# Patient Record
Sex: MALE | Race: WHITE
[De-identification: names, ages, dates, MRNs, and addresses within clinical notes are randomized per-mention and may not be internally consistent; named-entity substitution may affect disease eponyms.]

---

## 2017-03-02 ENCOUNTER — HOSPITAL ENCOUNTER (OUTPATIENT)
Dept: HOSPITAL 17 - HDOC | Age: 62
LOS: 1 days | Discharge: HOME | End: 2017-03-03
Attending: INTERNAL MEDICINE
Payer: MEDICARE

## 2017-03-02 VITALS
RESPIRATION RATE: 20 BRPM | HEART RATE: 80 BPM | OXYGEN SATURATION: 95 % | DIASTOLIC BLOOD PRESSURE: 107 MMHG | TEMPERATURE: 98 F | SYSTOLIC BLOOD PRESSURE: 160 MMHG

## 2017-03-02 VITALS
DIASTOLIC BLOOD PRESSURE: 103 MMHG | HEART RATE: 101 BPM | TEMPERATURE: 98.2 F | SYSTOLIC BLOOD PRESSURE: 148 MMHG | RESPIRATION RATE: 18 BRPM | OXYGEN SATURATION: 95 %

## 2017-03-02 VITALS — HEART RATE: 102 BPM

## 2017-03-02 VITALS
DIASTOLIC BLOOD PRESSURE: 89 MMHG | RESPIRATION RATE: 16 BRPM | TEMPERATURE: 98.6 F | SYSTOLIC BLOOD PRESSURE: 137 MMHG | OXYGEN SATURATION: 95 % | HEART RATE: 82 BPM

## 2017-03-02 VITALS — WEIGHT: 315 LBS | HEIGHT: 71 IN | BODY MASS INDEX: 44.1 KG/M2

## 2017-03-02 VITALS
SYSTOLIC BLOOD PRESSURE: 129 MMHG | TEMPERATURE: 97.8 F | OXYGEN SATURATION: 95 % | HEART RATE: 98 BPM | RESPIRATION RATE: 16 BRPM | DIASTOLIC BLOOD PRESSURE: 93 MMHG

## 2017-03-02 VITALS — HEART RATE: 96 BPM

## 2017-03-02 VITALS — HEART RATE: 98 BPM

## 2017-03-02 DIAGNOSIS — Z79.82: ICD-10-CM

## 2017-03-02 DIAGNOSIS — R00.2: ICD-10-CM

## 2017-03-02 DIAGNOSIS — E66.01: ICD-10-CM

## 2017-03-02 DIAGNOSIS — R06.09: ICD-10-CM

## 2017-03-02 DIAGNOSIS — I25.10: ICD-10-CM

## 2017-03-02 DIAGNOSIS — Z79.01: ICD-10-CM

## 2017-03-02 DIAGNOSIS — R53.83: ICD-10-CM

## 2017-03-02 DIAGNOSIS — E11.59: ICD-10-CM

## 2017-03-02 DIAGNOSIS — I10: ICD-10-CM

## 2017-03-02 DIAGNOSIS — I48.2: Primary | ICD-10-CM

## 2017-03-02 DIAGNOSIS — E78.5: ICD-10-CM

## 2017-03-02 LAB
ANION GAP SERPL CALC-SCNC: 8 MEQ/L (ref 5–15)
APTT BLD: 30.6 SEC (ref 24.3–30.1)
BASOPHILS # BLD AUTO: 0.1 TH/MM3 (ref 0–0.2)
BASOPHILS NFR BLD: 0.7 % (ref 0–2)
BUN SERPL-MCNC: 18 MG/DL (ref 7–18)
CHLORIDE SERPL-SCNC: 101 MEQ/L (ref 98–107)
EOSINOPHIL # BLD: 0.2 TH/MM3 (ref 0–0.4)
EOSINOPHIL NFR BLD: 2.7 % (ref 0–4)
ERYTHROCYTE [DISTWIDTH] IN BLOOD BY AUTOMATED COUNT: 14.3 % (ref 11.6–17.2)
GFR SERPLBLD BASED ON 1.73 SQ M-ARVRAT: 75 ML/MIN (ref 89–?)
HCO3 BLD-SCNC: 29.6 MEQ/L (ref 21–32)
HCT VFR BLD CALC: 54.9 % (ref 39–51)
HEMO FLAGS: (no result)
INR PPP: 1.1 RATIO
LYMPHOCYTES # BLD AUTO: 2 TH/MM3 (ref 1–4.8)
LYMPHOCYTES NFR BLD AUTO: 29.5 % (ref 9–44)
MCH RBC QN AUTO: 31.1 PG (ref 27–34)
MCHC RBC AUTO-ENTMCNC: 34.4 % (ref 32–36)
MCV RBC AUTO: 90.5 FL (ref 80–100)
MONOCYTES NFR BLD: 10.7 % (ref 0–8)
NEUTROPHILS # BLD AUTO: 3.9 TH/MM3 (ref 1.8–7.7)
NEUTROPHILS NFR BLD AUTO: 56.4 % (ref 16–70)
PLATELET # BLD: 213 TH/MM3 (ref 150–450)
POTASSIUM SERPL-SCNC: 4.1 MEQ/L (ref 3.5–5.1)
PROTHROMBIN TIME: 12.7 SEC (ref 9.8–11.6)
RBC # BLD AUTO: 6.06 MIL/MM3 (ref 4.5–5.9)
SODIUM SERPL-SCNC: 139 MEQ/L (ref 136–145)
WBC # BLD AUTO: 6.9 TH/MM3 (ref 4–11)

## 2017-03-02 PROCEDURE — C1731 CATH, EP, 20 OR MORE ELEC: HCPCS

## 2017-03-02 PROCEDURE — C1732 CATH, EP, DIAG/ABL, 3D/VECT: HCPCS

## 2017-03-02 PROCEDURE — 85002 BLEEDING TIME TEST: CPT

## 2017-03-02 PROCEDURE — 85730 THROMBOPLASTIN TIME PARTIAL: CPT

## 2017-03-02 PROCEDURE — 93320 DOPPLER ECHO COMPLETE: CPT

## 2017-03-02 PROCEDURE — 93005 ELECTROCARDIOGRAM TRACING: CPT

## 2017-03-02 PROCEDURE — 93623 PRGRMD STIMJ&PACG IV RX NFS: CPT

## 2017-03-02 PROCEDURE — 93325 DOPPLER ECHO COLOR FLOW MAPG: CPT

## 2017-03-02 PROCEDURE — 85610 PROTHROMBIN TIME: CPT

## 2017-03-02 PROCEDURE — 86901 BLOOD TYPING SEROLOGIC RH(D): CPT

## 2017-03-02 PROCEDURE — C1766 INTRO/SHEATH,STRBLE,NON-PEEL: HCPCS

## 2017-03-02 PROCEDURE — 86850 RBC ANTIBODY SCREEN: CPT

## 2017-03-02 PROCEDURE — 85025 COMPLETE CBC W/AUTO DIFF WBC: CPT

## 2017-03-02 PROCEDURE — 92960 CARDIOVERSION ELECTRIC EXT: CPT

## 2017-03-02 PROCEDURE — C2630 CATH, EP, COOL-TIP: HCPCS

## 2017-03-02 PROCEDURE — 86900 BLOOD TYPING SEROLOGIC ABO: CPT

## 2017-03-02 PROCEDURE — 93312 ECHO TRANSESOPHAGEAL: CPT

## 2017-03-02 PROCEDURE — 93613 INTRACARDIAC EPHYS 3D MAPG: CPT

## 2017-03-02 PROCEDURE — C1759 CATH, INTRA ECHOCARDIOGRAPHY: HCPCS

## 2017-03-02 PROCEDURE — 00537 ANES CARDIAC EP PROCEDURES: CPT

## 2017-03-02 PROCEDURE — C1730 CATH, EP, 19 OR FEW ELECT: HCPCS

## 2017-03-02 PROCEDURE — 93662 INTRACARDIAC ECG (ICE): CPT

## 2017-03-02 PROCEDURE — 93656 COMPRE EP EVAL ABLTJ ATR FIB: CPT

## 2017-03-02 PROCEDURE — 80048 BASIC METABOLIC PNL TOTAL CA: CPT

## 2017-03-02 RX ADMIN — AMIODARONE HYDROCHLORIDE SCH MG: 200 TABLET ORAL at 14:34

## 2017-03-02 RX ADMIN — METOPROLOL TARTRATE SCH MG: 50 TABLET, FILM COATED ORAL at 21:10

## 2017-03-02 RX ADMIN — OXYCODONE HYDROCHLORIDE AND ACETAMINOPHEN PRN TAB: 5; 325 TABLET ORAL at 14:36

## 2017-03-02 RX ADMIN — OXYCODONE HYDROCHLORIDE AND ACETAMINOPHEN PRN TAB: 5; 325 TABLET ORAL at 20:01

## 2017-03-02 RX ADMIN — APIXABAN SCH MG: 5 TABLET, FILM COATED ORAL at 21:10

## 2017-03-02 RX ADMIN — AMIODARONE HYDROCHLORIDE SCH MG: 200 TABLET ORAL at 21:10

## 2017-03-02 NOTE — ETE
Study

 

Study Date:03/02/2017

 

 

 

STUDY CONCLUSIONS

 

SUMMARY

 

- Left ventricle: The cavity size was normal. Wall thickness was

normal. Systolic function was normal. The estimated ejection

fraction was in the range of 60% to 65%. Wall motion was normal;

there were no regional wall motion abnormalities.

- Aortic valve: No evidence of vegetation.

- Mitral valve: No evidence of vegetation. Mild regurgitation.

- Left atrium: No evidence of thrombus in the atrial cavity or

appendage.

- Right atrium: No evidence of thrombus in the atrial cavity or

appendage.

- Atrial septum: No defect or patent foramen ovale was identified.

Echo contrast study showed no right-to-left atrial level shunt,

at baseline or with provocation.

- Tricuspid valve: No evidence of vegetation.

- Pulmonic valve: No evidence of vegetation.

 

-------------------------------------------------------------------

If LV function is below 40, please consider prescribing an ACEI or

ARB or document rationale for non-use.

 

-------------------------------------------------------------------

PROCEDURE DATA

Consent: The risks, benefits, and alternatives to the procedure

were explained to the patient and informed consent was obtained.

Procedure: Initial setup. The patient was brought to the laboratory

in the fasting state. Intravenous access was obtained. Surface ECG

leads and pulse oximetric signals were monitored. Sedation.

Conscious sedation was administered by cardiology staff.

Transesophageal echocardiography. Topical anesthesia was obtained

using viscous lidocaine. A transesophageal probe was inserted by

the attending cardiologist. Image quality was good. Study

completion: All IVs inserted during the procedure were removed. The

patient tolerated the procedure well. There were no complications.

Transesophageal echocardiography. 2D, complete spectral Doppler,

and color Doppler.

 

-------------------------------------------------------------------

CARDIAC ANATOMY

 

LEFT VENTRICLE:

The cavity size was normal. Wall thickness was

normal. Systolic function was normal. The estimated ejection

fraction was in the range of 60% to 65%. Wall motion was normal;

there were no regional wall motion abnormalities.

 

AORTIC VALVE:

Trileaflet; mildly thickened, mildly calcified

leaflets. Cusp separation was normal. No evidence of vegetation.

Doppler: No significant regurgitation.

Aorta:

 

- There was no atheroma. There was no evidence for dissection.

Aortic root: The aortic root was not dilated.

Ascending aorta: The ascending aorta was normal in size.

Aortic arch: The aortic arch was normal in size.

Descending aorta: The descending aorta was normal in size.

 

MITRAL VALVE:

Structurally normal valve. Leaflet separation was

normal. No evidence of vegetation. Doppler: Mild regurgitation.

 

LEFT ATRIUM:

The atrium was normal in size. No evidence of thrombus

in the atrial cavity or appendage. The appendage was

morphologically a left appendage, multilobulated, and of normal

size. Emptying velocity was normal.

 

ATRIAL SEPTUM:

No defect or patent foramen ovale was identified.

Echo contrast study showed no right-to-left atrial level shunt, at

baseline or with provocation.

 

RIGHT VENTRICLE:

The cavity size was normal. Wall thickness was

normal. Systolic function was normal.

 

PULMONIC VALVE:

Structurally normal valve. No evidence of

vegetation.

 

TRICUSPID VALVE:

Structurally normal valve. Leaflet separation was

normal. No evidence of vegetation. Doppler: Trace regurgitation.

 

PULMONARY ARTERY:

The main pulmonary artery was normal-sized.

 

RIGHT ATRIUM:

The atrium was normal in size. No evidence of

thrombus in the atrial cavity or appendage. The appendage was

morphologically a right appendage.

 

PERICARDIUM:

There was no pericardial effusion.

Prepared and signed by

 

Peter Moran

[id number]

## 2017-03-02 NOTE — EKG
Date Performed: 03/02/2017       Time Performed: 06:48:52

 

PTAGE:      61 years

 

EKG:      Atrial fibrillation Abnormal ECG

 

PREVIOUS TRACING       : 02/02/2017 11.56 Compared to prior tracing no significant change

 

DOCTOR:   Irwin Albrecht  Interpretating Date/Time  03/02/2017 23:38:44

## 2017-03-02 NOTE — PD.CARD
Atrial Fibrillation Ablation


PROCEDURE DATE:  Mar 2, 2017


PROCEDURES PERFORMED:


1. Electrophysiology study on Isuprel infusion


2. CS cannulation


3. 3-D mapping


4. Transseptal approach


5. Right and left heart catheterization


6. Intracardiac echo


7. Radiofrequency ablation of atrial fibrillation


8. Pulmonary vein isolation


9. Posterior wall ablation


10. Mitral valve isolation


11. Mitral line creation


12. Left atrial tachycardia ablation


13. Roof line creation


14. Floor line creation


15. Anterior and posterior ablation


16. Cardioversion





INDICATIONS FOR THE PROCEDURE


Mr. Jimenez is a 61-year-old  male with hx of atrial fibrillation, 

very symptomatic, on anticoagulation was referred for electrophysiology study 

and ablation.  The risks, the nature and the benefits of the procedure were 

clearly stated to him.  The risks include pneumothorax, cardiac perforation, 

stroke, need for open heart surgery and even death.  The patient understood and 

agreed to proceed.





DESCRIPTION OF THE PROCEDURE IN DETAIL


As written informed consent was obtained prior to esophageal echocardiogram, 

the patient was kept on the table where he was prepped and draped in the usual 

sterile fashion.  Conscious sedation was initiated and maintained throughout 

the procedure by the anesthesiologist.  Once sedation was verified, the right 

and left inguinal areas were anesthetized with 2% Xylocaine.  Using modified 

Seldinger technique, the left femoral vein was cannulated on three occasions, 

three guidewires were advanced.  Over the wire a 6, 7 and a 10-Djiboutian Hemaquet 

were advanced.  Then the left femoral artery was cannulated on one occasion, 

one guidewire was advanced.  Over the wire a 4-Djiboutian Hemaquet was advanced.  

Then the right femoral vein was cannulated on one occasion, one guidewire was 

advanced.  Over the wire a 8-Djiboutian Hemaquet was advanced.  Then under 

fluoroscopic guidance through the 6 and 7-Djiboutian Hemaquet, two 5-Djiboutian 

Keven curved quadripolar electrophysiology catheters were advanced and 

placed around the His as well as coronary sinus.  Basic interval was measured.  

The patient was in atrial fibrillation.  Through the 10-Djiboutian Hemaquet, a 

Cordis Rueda AcuNav intracardiac echo catheter was advanced and placed at the 

right atrium.  Multiple view was obtained.  There is pericardial effusion, 

pulmonary vein was seen, atrial septal was visualized.  Then the 8-Djiboutian 

Hemaquet in the right femoral vein was exchanged for Agilis transseptal sheath 

that was placed all the way to the superior vena cava.  Through the sheath a 

Roshni needle was advanced, then the sheath, the dilator and the needle were 

progressed until foci engaged.  Once engaged, the needle was advanced.  RF was 

delivered for 2 seconds. I was able to cross into the left atrium.  Once the 

needle crossed, the dilator was advanced.  Once the dilator crossed, after 

multiple manipulation because of a thick septum,  the sheath was advanced.  

Once the sheath crossed, the dilator and the needle were removed.  At this 

point I did flood the system and fluid movement was seen in the left atrium the 

indicates the sheath is in good position.  The patient already received 10,000 

units of heparin.  The goal is to keep an ACT around 350 during ablation.  Then 

through the sheath a St. Sandip 20 pulse circumferential catheter was advanced.  

Using Plumbr endocardial solution mapping system, a two-dimensional 

configuration of the left atrium was obtained.  Points were taken at the left 

superior and inferior veins, right superior and inferior veins, mitral valve, 

and appendages.  Then through the sheath a St. Sandip TactiCath 65cm 3.5mm 

irrigated tipped mapping and radiofrequency ablation catheter was advanced.  

Esophageal probe was placed temperature monitoring during ablation.  When it 

increased to 0.5 degrees Celsius above baseline, I moved to a different area of 

the atrium.  First I did isolate the left superior and inferior vein.  I did 

make a  [place] around the veins.  Posterior was ablated.  Then a roof line was 

created, a floor line was created, a mitral line was isolated, then the mitral 

valve was isolated.  


I did create a line from the floor to the roof area, passing by the left atrial 

appendage.   Patient at this point was in left atrial tachycardia. Then the 

right superior and inferior veins were isolated.  I did remap the atrium.  

There is no significant signal in the atrium. Some more septal ablation was 

performed. At this point I decided to proceed with cardioversion.  A 200 sync 

biphasic joule was delivered that converted the patient into sinus rhythm.  At 

that point I did advance the circumferential catheter again into the vein.  

There was no signal into the vein, pacing from the vein showed no conduction to 

the atrium.  Isuprel infusion was initiated at 20 mcg for over 10 minutes.  No 

tachyarrhythmia was induced, post Isuprel no tachyarrhythmia was induced.  At 

that point the procedure was complete.  All catheters were removed, atrial 

septal sheath was exchanged for 9-Djiboutian Hemaquet, intracardiac echo showed no 

pericardial effusion.  There is still good flow in the pulmonary vein.  The 

patient is going to be transferred to the recovery room.  No incident report.  

The patient tolerated the procedure.  Blood loss was minimal.





FINDINGS


1. Electrocardiogram: At baseline the patient was in atrial fibrillation, post 

procedure the patient was in sinus rhythm.


2. Basic interval: Base cycle length was around 510.  Post ablation she was 

around 880 milliseconds.  AH at 82 and HV at 42 milliseconds.


3. Tachyarrhythmia: Atrial fibrillation was mapped and ablated.  Atrial 

tachycardia was ablated.  The ablation was successful.





CONCLUSION


Successful electrophysiology study, mapping, radiofrequency ablation of atrial 

fibrillation, left atrial tachycardia, pulmonary vein isolation, posterior wall 

ablation, anterior wall ablation, mitral valve isolation, mitral line creation, 

roof line creation, floor line creation, left atrial tachycardia, and 

cardioversion.





COMMENTS AND RECOMMENDATIONS


The patient is going to be transferred to the telemetry unit.  Will be observed 

and when stable can be discharged home.








Peter Moran MD Mar 2, 2017 11:46

## 2017-03-03 VITALS — HEART RATE: 80 BPM

## 2017-03-03 VITALS
TEMPERATURE: 98.6 F | OXYGEN SATURATION: 96 % | HEART RATE: 88 BPM | DIASTOLIC BLOOD PRESSURE: 88 MMHG | RESPIRATION RATE: 18 BRPM | SYSTOLIC BLOOD PRESSURE: 156 MMHG

## 2017-03-03 VITALS — HEART RATE: 90 BPM

## 2017-03-03 VITALS
OXYGEN SATURATION: 94 % | RESPIRATION RATE: 18 BRPM | DIASTOLIC BLOOD PRESSURE: 86 MMHG | SYSTOLIC BLOOD PRESSURE: 152 MMHG | HEART RATE: 86 BPM | TEMPERATURE: 98.4 F

## 2017-03-03 VITALS — HEART RATE: 88 BPM

## 2017-03-03 VITALS
DIASTOLIC BLOOD PRESSURE: 93 MMHG | SYSTOLIC BLOOD PRESSURE: 139 MMHG | OXYGEN SATURATION: 97 % | RESPIRATION RATE: 16 BRPM | TEMPERATURE: 98.4 F | HEART RATE: 80 BPM

## 2017-03-03 VITALS — HEART RATE: 81 BPM

## 2017-03-03 VITALS — HEART RATE: 85 BPM

## 2017-03-03 VITALS — HEART RATE: 86 BPM

## 2017-03-03 VITALS — HEART RATE: 82 BPM

## 2017-03-03 LAB
APTT BLD: 29.6 SEC (ref 24.3–30.1)
INR PPP: 1.2 RATIO
PROTHROMBIN TIME: 13.1 SEC (ref 9.8–11.6)

## 2017-03-03 RX ADMIN — APIXABAN SCH MG: 5 TABLET, FILM COATED ORAL at 10:08

## 2017-03-03 RX ADMIN — AMIODARONE HYDROCHLORIDE SCH MG: 200 TABLET ORAL at 10:09

## 2017-03-03 RX ADMIN — METOPROLOL TARTRATE SCH MG: 50 TABLET, FILM COATED ORAL at 10:09

## 2017-03-03 NOTE — EKG
Date Performed: 03/03/2017       Time Performed: 07:19:50

 

PTAGE:      61 years

 

EKG:      Sinus rhythm 

 

 with borderline 1st degree A-V block Septal ST-T changes are nonspecific Borderline ECG

 

PREVIOUS TRACING       : 03/02/2017 16.58

 

DOCTOR:   Peter Moran  Interpretating Date/Time  03/03/2017 22:55:29

## 2017-03-03 NOTE — EKG
Date Performed: 03/02/2017       Time Performed: 16:58:24

 

PTAGE:      61 years

 

EKG:      Sinus rhythm 

 

 Lateral T wave changes are nonspecific Borderline ECG

 

PREVIOUS TRACING       : 03/02/2017 12.26

 

DOCTOR:   Peter Moran  Interpretating Date/Time  03/03/2017 23:26:13

## 2017-03-03 NOTE — HHI.PR
Subjective


Remarks


Feeling better





Objective





 Vital Signs








  Date Time  Temp Pulse Resp B/P Pulse Ox O2 Delivery O2 Flow Rate FiO2


 


3/3/17 06:00  85      


 


3/3/17 05:00  80      


 


3/3/17 04:00  80      


 


3/3/17 03:00 98.4 86 16 139/93 97   


 


3/3/17 03:00  80      


 


3/3/17 02:00  86      


 


3/3/17 01:00  88      


 


3/3/17 00:00  90      


 


3/2/17 23:30 98.6 82 16 137/89 95   


 


3/2/17 23:00  96      


 


3/2/17 22:00  96      


 


3/2/17 21:00  96      


 


3/2/17 20:00 97.8 100 16 129/93 95   


 


3/2/17 20:00  98      


 


3/2/17 19:00  98      


 


3/2/17 18:00  102      


 


3/2/17 17:00  96      


 


3/2/17 16:00 98.2 100 18 148/103 95   


 


3/2/17 16:00  101      


 


3/2/17 14:15 97.5 85 16 110/67 94 Nasal Cannula 2 


 


3/2/17 13:00  84 16 112/62 93 Nasal Cannula 2 


 


3/2/17 12:45  81 15 115/67 92 Nasal Cannula 2 


 


3/2/17 12:30  82 15 114/63 96 Nasal Cannula 3 


 


3/2/17 12:15  78 15 115/68 95 Nasal Cannula 3 


 


3/2/17 12:00  85 15 117/70 96 Nasal Cannula 4 


 


3/2/17 11:55 98.4 86 14 118/82 93 Nasal Cannula 4 








 I/O








 3/2/17 3/2/17 3/2/17 3/3/17 3/3/17 3/3/17





 07:00 15:00 23:00 07:00 15:00 23:00


 


Intake Total  1500 ml  240 ml  


 


Output Total  1200 ml  750 ml  


 


Balance  300 ml  -510 ml  


 


      


 


Intake Oral    240 ml  


 


Other  1500 ml    


 


Output Urine Total  1150 ml  750 ml  


 


Estimated Blood Loss  50 ml    


 


# Bowel Movements    0  








Result Diagram:  


3/2/17 0625                                                                    

            3/2/17 0625





Imaging


Alert, fully oriented





Lungs: ventilated





Heart: S1, S2 regular





Abdomen: soft, no mass, obese





Ext: no edema








Laboratory Tests








Test 3/2/17 3/3/17





 06:25 05:40


 


Red Blood Count 6.06 MIL/MM3 





 (4.50-5.90) 


 


Hemoglobin 18.9 GM/DL 





 (13.0-17.0) 


 


Hematocrit 54.9 % 





 (39.0-51.0) 


 


Monocytes (%) (Auto) 10.7 % 





 (0.0-8.0) 


 


Prothrombin Time 12.7 SEC 13.1 SEC





 (9.8-11.6) (9.8-11.6)


 


Activated Partial 30.6 SEC 





Thromboplast Time (24.3-30.1) 


 


Estimat Glomerular Filtration 75 ML/MIN (>89) 





Rate  








 Current Medications








 Medications


  (Trade)  Dose


 Ordered  Sig/Diana


 Route  Start Time


 Stop Time Status Last Admin


 


 Sodium Chloride  500 ml @ 


 30 mls/hr  [id number]


 IV  3/2/17 07:45


     


 


 


  (Lr 1000 ml Inj)  1,000 ml @ 


 30 mls/hr  Q24H


 IV  3/2/17 08:00


     


 


 


 Miscellaneous


 Information  ALL


 NURSING


 DEPARTME...  UNSCH  PRN


 XX  3/2/17 12:45


 3/3/17 12:44   


 


 


  (Percocet  5-325


 Mg)  1 tab  Q4H  PRN


 PO  3/2/17 12:45


     


 


 


  (Percocet  5-325


 Mg)  2 tab  Q4H  PRN


 PO  3/2/17 12:45


    3/2/17 20:01


 


 


  (Ativan Inj)  0.5 mg  UNSCH  PRN


 IV  3/2/17 12:45


 3/3/17 12:44   


 


 


 Atropine Sulfate


 0.5 mg  0.5 mg  UNSCH  PRN


 IV  3/2/17 12:45


     


 


 


  ( ml Inj)  250 ml @ 


 500 mls/hr  ONCE  PRN


 IV  3/2/17 12:45


 3/3/17 12:44   


 


 


  (Reglan Inj)  10 mg  Q4H  PRN


 IV  3/2/17 12:45


     


 


 


  (Zofran Inj)  4 mg  Q4H  PRN


 IV  3/2/17 12:45


     


 


 


  (Xylocaine 1%


 Inj (50 ml))  10 ml  UNSCH  PRN


 INFIL  3/2/17 12:45


 3/3/17 12:44   


 


 


  (Eliquis)  5 mg  BID


 PO  3/2/17 21:00


    3/3/17 10:08


 


 


  (Glucophage)  500 mg  BIDPC


 PO  3/2/17 18:00


    3/3/17 10:08


 


 


  (KCl)  10 meq  DAILY


 PO  3/3/17 09:00


    3/3/17 10:08


 


 


  (Effexor Xr)  150 mg  DAILY


 PO  3/3/17 09:00


    3/3/17 10:09


 


 


  (Lopressor)  50 mg  BID


 PO  3/2/17 21:00


    3/3/17 10:09


 


 


  (Cordarone)  400 mg  BID


 PO  3/2/17 13:00


 3/6/17 21:01  3/3/17 10:09


 


 


  (Cordarone)  200 mg  DAILY


 PO  3/7/17 09:00


     


 


 


  (Prinivil)  20 mg  DAILY


 PO  3/3/17 09:00


    3/3/17 10:08


 


 


  (Microzide)  12.5 mg  DAILY


 PO  3/3/17 09:00


    3/3/17 10:08


 











Assessment and Plan


Problem List:  


(1) Atrial fibrillation


Status:  Acute


Plan:  SP ablation. In sinus rhythm.


Meds modified


Will be DH.


Follow up as previously scheduled





(2) Shortness of breath


Status:  Acute


Plan:  Improving





(3) Hypertension


Status:  Chronic


Plan:  . Stable











Peter Moran MD Mar 3, 2017 10:29

## 2017-03-03 NOTE — EKG
Date Performed: 03/02/2017       Time Performed: 12:26:43

 

PTAGE:      61 years

 

EKG:      Sinus rhythm 

 

 NONSPECIFIC T-WAVE ABNORMALITY BORDERLINE ECG INTERPRETATION BASED ON A DEFAULT AGE OF 40 YEARS 

 

NO PREVIOUS TRACING            

 

DOCTOR:   Peter Moran  Interpretating Date/Time  03/03/2017 23:35:01

## 2018-02-09 NOTE — MR
cc:

LEESA SABILLON MD

****

 

 

DATE:  02/09/2018.

 

PROCEDURE PERFORMED:

Loop recorder insertion.

 

INDICATIONS FOR THE PROCEDURE:

Atrial fibrillation detection.

 

DESCRIPTION OF THE PROCEDURE IN DETAIL:

The patient was brought to the DOC unit in the postabsorptive state.  after

informed consent was obtained a Medtronic reveal LINQ loop recorder was

inserted into the subcutaneous left chest.  The patient tolerated procedure

well without any apparent complications.

 

Tachybrady pause and atrial fibrillation detection was enabled.  The initial

R-wave was 0.27 mV.  The serial number was are [id number].

 

 

 

                              _________________________________

                              MD ANGÉLICA Ding/MADISYN

D:  2/9/2018/1:18 PM

T:  2/9/2018/3:12 PM

Visit #:  [id number]

Job #:  [id number]

## 2018-03-24 NOTE — PD
HPI


Chief Complaint:  Medical Device Problem


Time Seen by Provider:  18:16


Travel History


International Travel<30 days:  No


Contact w/Intl Traveler<30days:  No


Traveled to known affect area:  No





History of Present Illness


HPI


62-year-old male patient from [place] with history of chronic back pains, multiple 

back surgeries and neck surgeries, currently sees a pain management specialist, 

Dr. Patrick King in [facility], who had to place a Medtronic pain pump 

on him in 2012, and he gets the pain pump filled monthly by a nurse, had the 

pump medication placed last week.  However, he states that today he heard 

beeping of the pump, called his pain management doctor in [place] and was told 

that the beeping likely means that his batteries have run out and that if this 

is the case, he will soon go through severe withdrawals.  Patient is not 

currently having any vomiting or significant symptoms.  I have talked to Dr. King who has recommended that the patient get further evaluation and 

replacement of the pump by a pain management doctor, states that the patient 

will go through severe withdrawals and will need to be on high doses of 

Dilaudid and clonidine to prevent withdrawals while waiting for the pump to be 

replaced.  The pump will need to be surgically replaced.  He would suggest the 

patient be admitted meanwhile awaiting replacement.





Modifying Factors: None


Associated Signs & Symptoms: Pain pump may need to be replaced


Risk Factors: Chronic pain





PFSH


Past Medical History


Hx Anticoagulant Therapy:  Yes


Autoimmune Disease:  No


Cancer:  No


Cardiovascular Problems:  Yes (afib)


Chemotherapy:  No


Chest Pain:  No


Cerebrovascular Accident:  No


Diabetes:  Yes


Patient Takes Glucophage:  No


Endocrine:  Yes


Gastrointestinal Disorders:  No


Glaucoma:  No


Genitourinary:  No


Hepatitis:  No


Hiatal Hernia:  No


Hypertension:  Yes


Immune Disorder:  No


Implanted Vascular Access Dvce:  Yes


Musculoskeletal:  Yes


Neurologic:  No


Psychiatric:  No


Reproductive:  No


Respiratory:  No


Integumentary:  No


Sickle Cell Disease:  No


Thyroid Disease:  No


Pregnant?:  Not Pregnant





Past Surgical History


Body Medical Devices:  PAIN PUMP


Other Surgery:  Yes (ORTHOPEDIC BASED SURGERIES..PLATES IN THE RT LEG)





Social History


Alcohol Use:  Yes (OCC)


Tobacco Use:  No


Substance Use:  No





Allergies-Medications


(Allergen,Severity, Reaction):  


Coded Allergies:  


     No Known Allergies (Unverified  Allergy, Unknown, 3/24/18)


Reported Meds & Prescriptions





Reported Meds & Active Scripts


Active


Amiodarone (Amiodarone HCl) 200 Mg Tab 200 Mg PO DAILY


Amiodarone (Amiodarone HCl) 200 Mg Tab 400 Mg PO BID


Reported


Venlafaxine ER 24 HR (Venlafaxine HCl) 150 Mg Tab 150 Mg PO DAILY


Potassium Chloride ER (Potassium Chloride) 10 Meq Cap 10 Meq PO DAILY


Metoprolol Tartrate 100 Mg Tab 100 Mg PO BID


Metformin (Metformin HCl) 500 Mg Tab 500 Mg PO BIDPC


     With meals


Lisinopril-Hctz 20-12.5 Mg Tab 1 Tab PO DAILY


Eliquis (Apixaban) 5 Mg Tab 5 Mg PO BID








Review of Systems


Except as stated in HPI:  all other systems reviewed are Neg





Physical Exam


Narrative


GENERAL: Well-developed elderly male patient currently and no acute distress.  

Awake and oriented 3.


SKIN: Focused skin assessment warm/dry.


HEAD: Atraumatic. Normocephalic. 


EYES: Pupils equal and round. No scleral icterus. No injection or drainage. 


ENT: No nasal bleeding or discharge.  Mucous membranes pink and moist.


NECK: Trachea midline. No JVD. 


CARDIOVASCULAR: Regular rate and rhythm.  No murmur appreciated.


RESPIRATORY: No accessory muscle use. Clear to auscultation. Breath sounds 

equal bilaterally. 


GASTROINTESTINAL: Abdomen soft, non-tender, nondistended. Hepatic and splenic 

margins not palpable. 


MUSCULOSKELETAL: No obvious deformities. No clubbing.  No cyanosis.  No edema. 


NEUROLOGICAL: Awake and alert. No obvious cranial nerve deficits.  Motor 

grossly within normal limits. Normal speech.


PSYCHIATRIC: Appropriate mood and affect; insight and judgment normal.





Data


Data


Last Documented VS





Vital Signs








  Date Time  Temp Pulse Resp B/P (MAP) Pulse Ox O2 Delivery O2 Flow Rate FiO2


 


3/24/18 14:07 97.8 98 18 173/105 (127) 97   











MDM


Medical Decision Making


Medical Screen Exam Complete:  Yes


Emergency Medical Condition:  Yes


Medical Record Reviewed:  Yes


Differential Diagnosis


Pain pump malfunction/beeping


Narrative Course


I have talked to [employer] representative regarding the case, and was told that 

they are not able to interrogate the pump to find out if the battery is out.  

They state that they can only find out what the levels of pain medication in 

the pump is.  I have put out calls for Dr. Sheppard and Dr. Morris, local pain 

management doctors, and am awaiting callback regarding this patient.





Physician Communication


Physician Communication


Case is signed out to Dr. Traylor at 7 PM awaiting pain management doctor call 

back.





Diagnosis





 Primary Impression:  


 Encounter for chronic pain management


Condition:  Stable











Ronnie Meier MD Mar 24, 2018 18:45

## 2018-03-24 NOTE — PD
Physical Exam


Date Seen by Provider:  Mar 24, 2018


Time Seen by Provider:  19:43


Narrative


Accepted in transfer of care from Dr Meier





Data


Data


Last Documented VS





Vital Signs








  Date Time  Temp Pulse Resp B/P (MAP) Pulse Ox O2 Delivery O2 Flow Rate FiO2


 


3/24/18 21:00  102 14 142/82 (102) 98 Room Air  


 


3/24/18 14:07 97.8       








Orders





 Orders


Ondansetron Inj (Zofran Inj) (3/24/18 22:00)


Hydromorphone Pf Inj (Dilaudid Pf Inj) (3/24/18 22:00)


Bedside Glucose ELIE.[email or web address] (3/25/18 00:14)


Blood Glucose Goal (Criteria) (3/25/18 00:14)


Hypoglycemia 70 Mg/Dl Or < (3/25/18 00:14)


Notify Dr: Other (3/25/18 00:14)


Dextrose 50% In Ines (Vial) Inj (D50w (Vi (3/25/18 00:15)


Glucagon Inj (Glucagon Inj) (3/25/18 00:15)


Low Novolog Scale (3/25/18 08:00)


Hydromorphone Pf Inj (Dilaudid Pf Inj) (3/25/18 00:15)


Clonidine (Catapres) (3/25/18 00:15)


Place In Observation (3/25/18 )


Vital Signs (Adult) Q4H (3/25/18 00:14)


Activity Oob Ad Tammy (3/25/18 00:14)


Diet 1800 Ada Cons Carb (3/25/18 Breakfast)


Sodium Chloride 0.9% Flush (Ns Flush) (3/25/18 00:15)


Sodium Chloride 0.9% Flush (Ns Flush) (3/25/18 09:00)


Ondansetron Inj (Zofran Inj) (3/25/18 00:15)


Comprehensive Metabolic Panel (3/26/18 06:00)


Complete Blood Count With Diff (3/26/18 06:00)


Prothrombin Time / Inr (Pt) (3/26/18 06:00)


Pharmacologic Contraindication (3/25/18 00:14)


Acetaminophen (Tylenol) (3/25/18 00:15)


Hydromorphone (Dilaudid) (3/25/18 00:15)


Docusate Sodium-Senna (Nani-Colace) (3/25/18 09:00)


Magnesium Hydroxide Liq (Milk Of Magnesi (3/25/18 00:15)


Sennosides (Senokot) (3/25/18 00:15)


Bisacodyl Supp (Dulcolax Supp) (3/25/18 00:15)


Lactulose Liq (Lactulose Liq) (3/25/18 00:15)


Amiodarone (Cordarone) (3/25/18 09:00)


Amiodarone (Cordarone) (3/25/18 09:00)


Apixaban (Eliquis) (3/25/18 09:00)


Metoprolol Tartrate (Lopressor) (3/25/18 09:00)


Venlafaxine Xr (Effexor Xr) (3/25/18 09:00)


(Nf) Lisinopril-Hctz (3/25/18 09:00)


Admit Order (Ed Use Only) (3/25/18 )


Vital Signs (Adult) Q4H (3/25/18 00:17)


Diet Heart Healthy (3/25/18 Breakfast)


Activity Oob With Assistance (3/25/18 00:17)


Notify Dr: Other (3/25/18 00:17)








[facility]


Medical Record Reviewed:  Yes


Supervised Visit with PENNY:  No


Differential Diagnosis


Accepted in transfer of care from Dr Meier; please refer to her 

dictation


Narrative Course


Accepted in transfer of care from Dr Meier; waiting for call back from 

pain management


Physician Communication


Physician Communication


discussed with Dr Farrell; Dr Goodman for Dr Morris; medtronic





Diagnosis





 Primary Impression:  


 Intractable back pain


 Additional Impression:  


 Malfunction of device





Admitting Information


Admitting Physician Requests:  Observation


Condition:  Stable











Velia Traylor MD Mar 24, 2018 19:44

## 2018-03-25 NOTE — HHI.PR
Addendum to Inpatient Note


Addendum Reason:  Additional Documentation


Additional Information


I have attempted to transfer pt to [facility] however we were told 

initially that Dr. Galeana, pain management, was on call. Later, I was called 

by CM, notifying me that Dr. Galeana is only on call for his own patients and 

that they didn't have any other pain specialists on call for the week-end. I 

requested CM to find out if they have any pain specialist on call for tomorrow. 

However, I haven't yet heard back. Plan is to contact [facility] tomorrow and 

find out if they do have a pain specialist on call who would be willing to 

accept this patient. If not, then CM to assist MD with finding another facility 

who has pain specialist on call. 


Another option is to contact Dr. Sheppard or Dr. Morris tomorrow and see if they 

would be willing to see the patient in their office in the AM. 


[facility] doesn't have any pain specialist available.











Paloma Sibley MD Mar 25, 2018 18:12

## 2018-03-25 NOTE — HHI.HP
__________________________________________________





[facility]


Service


[facility]


Primary Care Physician


Non-Staff


Admission Diagnosis





Intractable pain; indwelling device malfunction


Diagnoses:  


(1) Intractable back pain


Diagnosis:  Principal





(2) Malfunction of device


Diagnosis:  Principal





(3) A-fib


Diagnosis:  Principal





(4) DM (diabetes mellitus)


Diagnosis:  Principal





Travel History


International Travel<30 Days:  No


Contact w/Intl Traveler <30 Da:  No


Traveled to Known Affected Are:  No


History of Present Illness


This is a 62-year-old male with a PMH of HTN, A. fib on Eliquis, DM, Chronic 

Pain s/p Medtr[employer] Pain Pump who presented to the ER for malfunctioning device.

  [facility] he follows w/ Pain Management Specialist in [place], Dr. King who 

implanted device in .  Had device evaluated prior to coming to [facility], 

pain pump noted to have 9 month battery life remaining.  Today, states device 

alarmed, called Dr. King and was instructed to come to ER to fix device and 

avoid withdrawal as on high dose Dilaudid.  Denies withdrawal at this time.  On 

arrival, /105, HR 98, O2 sat 97% RA, Afebrile.  ER physician contacted 

Pain Management Specialist Dr. Goodman who recommended consult for pump evaluation.





Review of Systems


Except as stated in HPI:  all other systems reviewed are Neg


ROS: 14 point review of systems otherwise negative.





Past Family Social History


Past Medical History


PMH:  HTN, A. fib on Eliquis, DM, Chronic Pain s/p Medtronic Pain Pump


Past Surgical History


PAST SURGICAL HISTORY: Pain Pump


Allergies:  


Coded Allergies:  


     No Known Allergies (Unverified  Allergy, Unknown, 3/24/18)


Family History


PAST FAMILY HISTORY:  Reviewed.  No h/o DM or CAD


Social History


PAST SOCIAL HISTORY: Occasional alcohol.  Negative for tobacco or drugs.





Physical Exam


Vital Signs





Vital Signs








  Date Time  Temp Pulse Resp B/P (MAP) Pulse Ox O2 Delivery O2 Flow Rate FiO2


 


3/24/18 21:00  102 14 142/82 (102) 98 Room Air  


 


3/24/18 14:07 97.8 98 18 173/105 (127) 97   








Physical Exam


PE:


GENERAL: Pleasant middle-age male in no acute distress.


HEENT: PERRLA, EOMI. No scleral icterus or conjunctival pallor. No lid lag or 

facial droop.  


CARDIOVASCULAR: Regular rate and rhythm.  No obvious murmurs to auscultation. 

No chest tenderness to palpation. 


RESPIRATORY: No obvious rhonchi or wheezing. Clear to auscultation. Breath 

sounds equal bilaterally. 


GASTROINTESTINAL: Abdomen soft, non-tender, nondistended. BS normal. 


MUSCULOSKELETAL: Extremities without clubbing, cyanosis, or edema. No obvious 

deformities. 


NEUROLOGICAL: Awake, alert and oriented x4. No focal neurologic deficits. 

Moving both upper and lower extremities spontaneously.





Caprini VTE Risk Assessment


Caprini VTE Risk Assessment:  No/Low Risk (score <= 1)


Caprini Risk Assessment Model











 Point Value = 1          Point Value = 2  Point Value = 3  Point Value = 5


 


Age 41-60


Minor surgery


BMI > 25 kg/m2


Swollen legs


Varicose veins


Pregnancy or postpartum


History of unexplained or recurrent


   spontaneous 


Oral contraceptives or hormone


   replacement


Sepsis (< 1 month)


Serious lung disease, including


   pneumonia (< 1 month)


Abnormal pulmonary function


Acute myocardial infarction


Congestive heart failure (< 1 month)


History of inflammatory bowel disease


Medical patient at bed rest Age 61-74


Arthroscopic surgery


Major open surgery (> 45 min)


Laparoscopic surgery (> 45 min)


Malignancy


Confined to bed (> 72 hours)


Immobilizing plaster cast


Central venous access Age >= 75


History of VTE


Family history of VTE


Factor V Leiden


Prothrombin 23751U


Lupus anticoagulant


Anticardiolipin antibodies


Elevated serum homocysteine


Heparin-induced thrombocytopenia


Other congenital or acquired


   thrombophilia Stroke (< 1 month)


Elective arthroplasty


Hip, pelvis, or leg fracture


Acute spinal cord injury (< 1 month)








Prophylaxis Regimen











   Total Risk


Factor Score Risk Level Prophylaxis Regimen


 


0-1      Low Early ambulation


 


2 Moderate Order ONE of the following:


*Sequential Compression Device (SCD)


*Heparin 5000 units SQ BID


 


3-4 Higher Order ONE of the following medications:


*Heparin 5000 units SQ TID


*Enoxaparin/Lovenox 40 mg SQ daily (WT < 150 kg, CrCl > 30 mL/min)


*Enoxaparin/Lovenox 30 mg SQ daily (WT < 150 kg, CrCl > 10-29 mL/min)


*Enoxaparin/Lovenox 30 mg SQ BID (WT < 150 kg, CrCl > 30 mL/min)


AND/OR


*Sequential Compression Device (SCD)


 


5 or more Highest Order ONE of the following medications:


*Heparin 5000 units SQ TID (Preferred with Epidurals)


*Enoxaparin/Lovenox 40 mg SQ daily (WT < 150 kg, CrCl > 30 mL/min)


*Enoxaparin/Lovenox 30 mg SQ daily (WT < 150 kg, CrCl > 10-29 mL/min)


*Enoxaparin/Lovenox 30 mg SQ BID (WT < 150 kg, CrCl > 30 mL/min)


AND


*Sequential Compression Device (SCD)











Assessment and Plan


Problem List:  


(1) Intractable back pain


ICD Code:  M54.9 - Dorsalgia, unspecified


Status:  Acute


(2) Malfunction of device


ICD Code:  T85.618A - Breakdown (mechanical) of other specified internal 

prosthetic devices, implants and grafts, initial encounter


Status:  Acute


(3) A-fib


ICD Code:  I48.91 - Unspecified atrial fibrillation


(4) DM (diabetes mellitus)


ICD Code:  E11.9 - Type 2 diabetes mellitus without complications


Assessment and Plan


A/P:


1.  Intractable Pain:  h/o chronic pain, s/p Medtronic pump, now 

malfunctioning.  On high dose Dilaudid, will start PO Dilaudid w/ IV Dilaudid 

for breakthrough.  Clonidine 0.1mg q8h for withdrawal.


2.  Malfunctioning Device:  s/p eval by [employer], battery malfunction, device 

will need replacement.  Follows w/ Dr. King in [place] who recommends Pain 

Management here evaluate for surgical intervention.  Dr. Goodman contacted by ER 

physician, recommended Dr. Morris or Dr. Sheppard for consultation.  


3.  A-fib:  Chronic.  Resume home medications. 


4.  DM:  Sliding scale w/ Accu-Cheks.  Hold Metformin for now. 


5.  DVT Prophylaxis:  Eliquis


6.  Social work for d/c planning as needed. 


7.  Case discussed w/ ER physician at length.











Vanessa Farrell MD Mar 25, 2018 00:46

## 2018-03-25 NOTE — HHI.PR
Subjective


Remarks


Follow up for pain pump malfunction. The patient reports chronic neck/back pain

, currently rated 7/10, he is getting relief with IV dilaudid. He states he 

knows his pump is out of battery. 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 

He states he was told before he left [place] that he probably needed to have pump 

battery replaced, however he should be able to last 9 months therefore he came 

to [place] without having it replaced prior to leaving. He currently reports a 

mild headache, otherwise has no other acute medical complaints including no 

lightheadedness/dizziness, chest pain, palpitations, shortness of breath, or 

abdominal complaints. Last BM yesterday morning.





Objective


Vitals





Vital Signs








  Date Time  Temp Pulse Resp B/P (MAP) Pulse Ox O2 Delivery O2 Flow Rate FiO2


 


3/25/18 08:22 97.9 82 20 137/84 (101) 96   


 


3/25/18 06:27   18     


 


3/25/18 04:08 98.7 90 20 131/81 (98) 93   


 


3/25/18 02:37 98.1 95 20 162/104 (123) 95   


 


3/25/18 02:33        


 


3/24/18 21:00  102 14 142/82 (102) 98 Room Air  


 


3/24/18 14:07 97.8 98 18 173/105 (127) 97   








Objective Remarks


GENERAL: Well-nourished, well-developed male patient in NAD.


SKIN: Warm and dry. No rash.


HEENT:  Normocephalic. Atraumatic.Pupils equal and round.  Mucous membranes 

pink and moist.


NECK: Supple. Trachea midline.  


CARDIOVASCULAR: Regular rate and rhythm.  No murmur appreciated. 


RESPIRATORY: No accessory muscle use. Clear to auscultation. Breath sounds 

equal bilaterally.  


GASTROINTESTINAL: Abdomen soft, non-tender, nondistended. Normoactive bowel 

sounds x4.


MUSCULOSKELETAL: No obvious deformities. Extremities without clubbing, cyanosis

, or edema. Pain pump palpable at left lumbar region. Old extensive surgical 

scars throughout cervical spine and lumbar spine.   


NEUROLOGICAL: Awake and alert. No obvious cranial nerve deficits.  Motor 

grossly within normal limits. 5/5 muscle strength in bilateral upper and lower 

extremities.  Normal speech.


PSYCHIATRIC: Appropriate mood and affect; insight and judgment normal.


Medications and IVs





Current Medications








 Medications


  (Trade)  Dose


 Ordered  Sig/Diana


 Route  Start Time


 Stop Time Status Last Admin


 


  (D50w (Vial) Inj)  50 ml  UNSCH  PRN


 IV PUSH  3/25/18 00:15


     


 


 


  (Glucagon Inj)  1 mg  UNSCH  PRN


 OTHER  3/25/18 00:15


     


 


 


  (NovoLOG


 SUPPLEMENTAL


 SCALE)  1  ACHS SLIDING  SCALE


 SQ  3/25/18 08:00


     


 


 


  (Catapres)  0.1 mg  Q8HR


 PO  3/25/18 00:15


    3/25/18 05:57


 


 


  (NS Flush)  2 ml  UNSCH  PRN


 IV FLUSH  3/25/18 00:15


     


 


 


  (NS Flush)  2 ml  BID


 IV FLUSH  3/25/18 09:00


    3/25/18 08:33


 


 


  (Zofran Inj)  4 mg  Q6H  PRN


 IVP  3/25/18 00:15


     


 


 


  (Tylenol)  650 mg  Q6H  PRN


 PO  3/25/18 00:15


     


 


 


  (Dilaudid)  4 mg  Q4H  PRN


 PO  3/25/18 00:15


     


 


 


  (Nani-Colace)  1 tab  BID


 PO  3/25/18 09:00


     


 


 


  (Milk Of


 Magnesia Liq)  30 ml  Q12H  PRN


 PO  3/25/18 00:15


     


 


 


  (Senokot)  17.2 mg  Q12H  PRN


 PO  3/25/18 00:15


     


 


 


  (Dulcolax Supp)  10 mg  DAILY  PRN


 RECTAL  3/25/18 00:15


     


 


 


  (Lactulose Liq)  30 ml  DAILY  PRN


 PO  3/25/18 00:15


     


 


 


  (Cordarone)  400 mg  BID


 PO  3/25/18 09:00


    3/25/18 08:34


 


 


  (Eliquis)  5 mg  BID


 PO  3/25/18 09:00


    3/25/18 08:34


 


 


  (Lopressor)  100 mg  BID


 PO  3/25/18 09:00


    3/25/18 08:33


 


 


  (Effexor Xr)  150 mg  DAILY


 PO  3/25/18 09:00


    3/25/18 08:34


 


 


  (Prinivil)  20 mg  DAILY


 PO  3/25/18 09:00


    3/25/18 10:43


 


 


  (Hydrodiuril)  12.5 mg  DAILY


 PO  3/25/18 09:00


    3/25/18 10:42


 


 


  (Dilaudid Pf Inj)  1 mg  Q2H  PRN


 IV  3/25/18 02:30


    3/25/18 11:24


 











A/P


Problem List:  


(1) Intractable back pain


ICD Code:  M54.9 - Dorsalgia, unspecified


Status:  Acute


(2) Malfunction of device


ICD Code:  T85.618A - Breakdown (mechanical) of other specified internal 

prosthetic devices, implants and grafts, initial encounter


Status:  Acute


(3) A-fib


ICD Code:  I48.91 - Unspecified atrial fibrillation


(4) DM (diabetes mellitus)


ICD Code:  E11.9 - Type 2 diabetes mellitus without complications


Assessment and Plan


62-year-old male with a PMH of HTN, A. fib on Eliquis, DM, Chronic Pain s/p 

[employer] Pain Pump who presented to the ER for malfunctioning device. Follows w

/ Pain Management Specialist in [place], Dr. King who implanted device in 

2012.  Had device evaluated prior to coming to [facility], pain pump noted to have 

9 month battery life remaining.  Today, states device alarmed, called Dr. King and was instructed to come to ER to fix device and avoid withdrawal as 

on high dose Dilaudid. ER physician contacted Pain Management Specialist Dr. Goodman who recommended consult for pump evaluation.





Intractable Pain:  h/o chronic pain, s/p Medtronic pump, now malfunctioning.  

On high dose Dilaudid pump, will start PO Dilaudid w/ IV Dilaudid for 

breakthrough.  Clonidine 0.1mg q8h for withdrawal.





Malfunctioning Device:  s/p eval by [employer], battery malfunction, device will 

need replacement.  Follows w/ Dr. King in [place] who recommends Pain 

Management here evaluate for surgical intervention.  Dr. Goodman contacted by ER 

physician, recommended Dr. Morris or Dr. Sheppard for consultation. However we 

do not have pain management consultation available, RN attempting to contact 

Dr. Goodman to establish plan for this patient. May need to transfer to  where 

pain management physician is available to replace pump. 





A-fib:  Chronic.  Resume home medications including amiodarone, lopressor. 

Patient received Eliquis this morning, however will hold for now and consider 

starting Heparin drip tonight incase surgery can be done. 





DM:  Sliding scale w/ Accu-Cheks.  Hold Metformin for now. 





DVT Prophylaxis:  Iqra Kirkland PA-C Mar 25, 2018 10:22 am

## 2018-03-26 NOTE — HHI.PR
Subjective


Remarks


Follow up for pain pump malfunction. The patient reports continued chronic neck/

back pain, rated as 8/10 currently, but states the IV dilaudid is helping to 

keep the pain under control. He is denies any other medical complaints 

including no chest pain, shortness of breath, abdominal pain, nausea/vomiting, 

or diarrhea. He denies any coronary artery disease, had cath a few years ago, 

reportedly clean.





Objective


Vitals





Vital Signs








  Date Time  Temp Pulse Resp B/P (MAP) Pulse Ox O2 Delivery O2 Flow Rate FiO2


 


3/26/18 08:47 98.7 96 18 115/77 (90) 98   


 


3/26/18 04:09  58      


 


3/26/18 03:45 96.9 59 17 116/78 (91) 94   


 


3/26/18 00:04 96.2 65 18 145/87 (106) 96   


 


3/26/18 00:00  64      


 


3/25/18 20:20  77      


 


3/25/18 19:47 96.8 108 18 140/83 (102) 96   


 


3/25/18 19:30 96.6 74 19 133/85 (101) 97   


 


3/25/18 16:00 97.5 72 20 130/92 (105) 94   


 


3/25/18 15:32  73      


 


3/25/18 15:21   18     


 


3/25/18 13:39  72  135/87 (103)    


 


3/25/18 12:09  66      


 


3/25/18 11:56 98.5 72 20 138/87 (104) 95   


 


3/25/18 10:41  73 18 130/89 (103)    














I/O      


 


 3/25/18 3/25/18 3/25/18 3/26/18 3/26/18 3/26/18





 07:00 15:00 23:00 07:00 15:00 23:00


 


Intake Total    480 ml  


 


Balance    480 ml  


 


      


 


Intake Oral    480 ml  


 


# Voids    8  


 


# Bowel Movements    0  








Result Diagram:  


3/26/18 0510                                                                   

             3/26/18 0510





Objective Remarks


GENERAL: Well-nourished, well-developed male patient in NAD.


SKIN: Warm and dry. No rash.


HEENT:  Normocephalic. Atraumatic. Pupils equal and round.  Mucous membranes 

pink and moist.


CARDIOVASCULAR: Regular rate and rhythm.  No murmur appreciated. 


RESPIRATORY: No accessory muscle use. Clear to auscultation. Breath sounds 

equal bilaterally.  


GASTROINTESTINAL: Abdomen soft, non-tender, nondistended. Normoactive bowel 

sounds x4.


MUSCULOSKELETAL: No obvious deformities. Extremities without clubbing, cyanosis

, or edema. Pain pump palpable at left lumbar region. Old extensive surgical 

scars throughout cervical spine and lumbar spine.   


NEUROLOGICAL: Awake and alert. No obvious cranial nerve deficits.  Motor 

grossly within normal limits.  Normal speech.


PSYCHIATRIC: Appropriate mood and affect; insight and judgment normal.


Medications and IVs





Current Medications








 Medications


  (Trade)  Dose


 Ordered  Sig/Diana


 Route  Start Time


 Stop Time Status Last Admin


 


  (D50w (Vial) Inj)  50 ml  UNSCH  PRN


 IV PUSH  3/25/18 00:15


     


 


 


  (Glucagon Inj)  1 mg  UNSCH  PRN


 OTHER  3/25/18 00:15


     


 


 


  (NovoLOG


 SUPPLEMENTAL


 SCALE)  1  ACHS SLIDING  SCALE


 SQ  3/25/18 08:00


     


 


 


  (Catapres)  0.1 mg  Q8HR


 PO  3/25/18 00:15


    3/26/18 05:03


 


 


  (NS Flush)  2 ml  UNSCH  PRN


 IV FLUSH  3/25/18 00:15


     


 


 


  (NS Flush)  2 ml  BID


 IV FLUSH  3/25/18 09:00


    3/26/18 10:34


 


 


  (Zofran Inj)  4 mg  Q6H  PRN


 IVP  3/25/18 00:15


     


 


 


  (Tylenol)  650 mg  Q6H  PRN


 PO  3/25/18 00:15


     


 


 


  (Dilaudid)  4 mg  Q4H  PRN


 PO  3/25/18 00:15


     


 


 


  (Nani-Colace)  1 tab  BID


 PO  3/25/18 09:00


    3/26/18 10:33


 


 


  (Milk Of


 Magnesia Liq)  30 ml  Q12H  PRN


 PO  3/25/18 00:15


     


 


 


  (Senokot)  17.2 mg  Q12H  PRN


 PO  3/25/18 00:15


     


 


 


  (Dulcolax Supp)  10 mg  DAILY  PRN


 RECTAL  3/25/18 00:15


     


 


 


  (Lactulose Liq)  30 ml  DAILY  PRN


 PO  3/25/18 00:15


    3/25/18 20:17


 


 


  (Cordarone)  400 mg  BID


 PO  3/25/18 09:00


    3/26/18 10:33


 


 


  (Eliquis)  5 mg  BID


 PO  3/25/18 09:00


   Future Hold 3/25/18 08:34


 


 


  (Lopressor)  100 mg  BID


 PO  3/25/18 09:00


    3/26/18 10:32


 


 


  (Effexor Xr)  150 mg  DAILY


 PO  3/25/18 09:00


    3/26/18 10:33


 


 


  (Prinivil)  20 mg  DAILY


 PO  3/25/18 09:00


    3/26/18 10:32


 


 


  (Hydrodiuril)  12.5 mg  DAILY


 PO  3/25/18 09:00


    3/26/18 10:31


 


 


  (Dilaudid Pf Inj)  1 mg  Q2H  PRN


 IV  3/25/18 02:30


    3/26/18 10:36


 


 


  (Fioricet


 325-50-40)  1 tab  Q6H  PRN


 PO  3/25/18 12:00


     


 


 


 Heparin Sodium/


 Dextrose  250 ml @ 


 18 mls/hr  TITRATE  PRN


 IV  3/25/18 20:00


    3/25/18 23:33


 











A/P


Problem List:  


(1) Intractable back pain


ICD Code:  M54.9 - Dorsalgia, unspecified


Status:  Acute


(2) Malfunction of device


ICD Code:  T85.618A - Breakdown (mechanical) of other specified internal 

prosthetic devices, implants and grafts, initial encounter


Status:  Acute


(3) A-fib


ICD Code:  I48.91 - Unspecified atrial fibrillation


(4) DM (diabetes mellitus)


ICD Code:  E11.9 - Type 2 diabetes mellitus without complications


Assessment and Plan


62-year-old male with a PMH of HTN, A. fib on Eliquis, DM, Chronic Pain s/p 

[employer] Pain Pump who presented to the ER for malfunctioning device. Follows w

/ Pain Management Specialist in [place], Dr. King who implanted device in 

2012.  Had device evaluated prior to coming to [facility], pain pump noted to have 

9 month battery life remaining.  Today, states device alarmed, called Dr. King and was instructed to come to ER to fix device and avoid withdrawal as 

on high dose Dilaudid. ER physician contacted Pain Management Specialist Dr. Goodman who recommended consult for pump evaluation.





Malfunctioning Pain Pump Device: Follows w/ Dr. King in [place] who recommends 

Pain Management to evaluate for surgical intervention.        


   -s/p eval by [employer], battery malfunction confirmed by device interrogation

, device will need replacement.


   -discussed with pain management Dr. Sheppard today 3/26, recommends transfer 

to [facility], continue admission to hospitalists, and will replace 

pump on Thursday 3/29





Intractable Pain:  h/o chronic pain, s/p Medtronic pump, now malfunctioning.  

On high dose Dilaudid pump. 


   -continue on PO Dilaudid w/ IV Dilaudid for breakthrough.  


   -Clonidine 0.1mg q8h for withdrawal.





A-fib:  Chronic.  


   -Resume home medications including amiodarone, lopressor. 


   -On Eliquis at home, last dose 3/25, now on hold for upcoming surgery


   -Continue on IV heparin drip for now, will d/c prior to surgery once OR time 

scheduled





DM:  Chronic


   -Hold Metformin for now. 


   -Continue Sliding scale w/ Accu-Cheks.  





DVT Prophylaxis: on IV heparin drip (patient's Eliquis on hold for procedure)


Discharge Planning


Transfer to [facility] when bed available. Notified [facility] physician 

Dr. Orantes in [place].  Discussed with RN, Charge RN, . 

Discussed with patient and his wife who is understanding and agrees with the 

plan.











Iqra Holly PA-C Mar 26, 2018 09:34

## 2018-03-27 NOTE — HHI.PR
Subjective


Remarks


Patient is awaiting a replacement of his pain pump which is malfunctioning.  

Pain is currently controlled on IV pain treatments.  No new complaints patient 

seen.





Objective





Vital Signs








  Date Time  Temp Pulse Resp B/P (MAP) Pulse Ox O2 Delivery O2 Flow Rate FiO2


 


3/27/18 12:00 96.4 64 18 108/70 (83) 95   


 


3/27/18 09:14  70      


 


3/27/18 08:00  59 18 102/70 (81) 96   


 


3/27/18 05:49   18     


 


3/27/18 04:00 96.9 63 20 111/73 (86) 95   


 


3/27/18 00:00 96.7 63 20 116/72 (87) 97   


 


3/26/18 23:00  61      


 


3/26/18 20:35  75      


 


3/26/18 20:09 98.7 72 16 111/72 (85) 98   


 


3/26/18 15:34 98.9 64 20 94/54 (67) 96   














I/O      


 


 3/26/18 3/26/18 3/26/18 3/27/18 3/27/18 3/27/18





 07:00 15:00 23:00 07:00 15:00 23:00


 


Intake Total 480 ml   480 ml  


 


Balance 480 ml   480 ml  


 


      


 


Intake Oral 480 ml   480 ml  


 


# Voids 8  4 2  


 


# Bowel Movements 0     








Result Diagram:  


3/26/18 0510                                                                   

             3/26/18 0510





Objective Remarks


GENERAL: NAD, A&Ox3


HEAD: Normocephalic. 


NECK: Supple, trachea midline. No lymphadenopathy.


EYES: No scleral icterus. No injection or drainage. 


CARDIOVASCULAR: Regular rate and rhythm without murmurs, gallops, or rubs. 


RESPIRATORY: Breath sounds equal bilaterally. No accessory muscle use.


GASTROINTESTINAL: Abdomen soft, non-tender, nondistended. 


MUSCULOSKELETAL: No cyanosis, or edema. 


SKIN: Warm and dry.


NEURO:  No focal neurological deficitis.





A/P


Problem List:  


(1) Malfunction of device


ICD Code:  T85.618A - Breakdown (mechanical) of other specified internal 

prosthetic devices, implants and grafts, initial encounter


Status:  Acute


(2) Intractable back pain


ICD Code:  M54.9 - Dorsalgia, unspecified


Status:  Acute


Assessment and Plan


62-year-old male admitted secondary to pain pump malfunction 





Malfunctioning pain pump


Irretractable pain


Replacement of pain pump needed


Surgical intervention planned


Dr. King following 


Continue IV Dilaudid for now





A-fib


Chronic


Continue amiodarone


Continue Lopressor


Follow clinically


Eliquis on hold, last dose is 03/25/18


IV heparin drip for now





Diabetes mellitus type 2


Follow blood sugars


Insulin sliding scale


Diabetic diet





DVT Prophylaxis


Heparin











Man Orantes MD Mar 27, 2018 14:03

## 2018-03-29 NOTE — HHI.DS
__________________________________________________





Discharge Summary


Admission Date


Mar 25, 2018 at 00:19


Discharge Date:  Mar 29, 2018


Admitting Diagnosis





Intractable pain; indwelling device malfunction





(1) Intractable back pain


ICD Code:  M54.9 - Dorsalgia, unspecified


Diagnosis:  Principal


Status:  Acute


(2) Malfunction of device


ICD Code:  T85.618A - Breakdown (mechanical) of other specified internal 

prosthetic devices, implants and grafts, initial encounter


Diagnosis:  Principal


Status:  Acute


(3) A-fib


ICD Code:  I48.91 - Unspecified atrial fibrillation


Diagnosis:  Principal





(4) DM (diabetes mellitus)


ICD Code:  E11.9 - Type 2 diabetes mellitus without complications


Diagnosis:  Principal





Procedures


Pain pump replacement


Brief History - From Admission


This is a 62-year-old male with a PMH of HTN, A. fib on Eliquis, DM, Chronic 

Pain s/p [employer] Pain Pump who presented to the ER for malfunctioning device.

  [facility] he follows w/ Pain Management Specialist in [place], Dr. King who 

implanted device in 2012.  Had device evaluated prior to coming to [facility], 

pain pump noted to have 9 month battery life remaining.  Today, [facility] device 

alarmed, called Dr. King and was instructed to come to ER to fix device and 

avoid withdrawal as on high dose Dilaudid.  Denies withdrawal at this time.  On 

arrival, /105, HR 98, O2 sat 97% RA, Afebrile.  ER physician contacted 

Pain Management Specialist Dr. Goodman who recommended consult for pump evaluation.


CBC/BMP:  


3/29/18 0505                                                                   

             3/29/18 0505





Significant Findings





Laboratory Tests








Test


  3/27/18


06:00 3/28/18


05:03 3/29/18


05:05


 


Activated Partial


Thromboplast Time 61.8 SEC


(24.3-30.1) 75.9 SEC


(24.3-30.1) 62.7 SEC


(24.3-30.1)


 


Red Blood Count


  


  4.46 MIL/MM3


(4.50-5.90) 4.36 MIL/MM3


(4.50-5.90)


 


Prothrombin Time


  


  


  12.2 SEC


(9.8-11.6)


 


Estimat Glomerular Filtration


Rate 


  


  86 ML/MIN


(>89)








PE at Discharge


GENERAL: Well-nourished, well-developed male patient in NAD.


SKIN: Warm and dry. No rash.


HEENT:  Normocephalic. Atraumatic. Pupils equal and round.  Mucous membranes 

pink and moist.


CARDIOVASCULAR: Regular rate and rhythm.  No murmur appreciated. 


RESPIRATORY: No accessory muscle use. Clear to auscultation. Breath sounds 

equal bilaterally.  


GASTROINTESTINAL: Abdomen soft, non-tender, nondistended. Normoactive bowel 

sounds x4.


MUSCULOSKELETAL: No obvious deformities. Extremities without clubbing, cyanosis

, or edema. Pain pump palpable at left lumbar region. Old extensive surgical 

scars throughout cervical spine and lumbar spine.   


NEUROLOGICAL: Awake and alert. No obvious cranial nerve deficits.  Motor 

grossly within normal limits.  Normal speech.


PSYCHIATRIC: Appropriate mood and affect; insight and judgment normal.


Hospital Course


Mr. Jimenez is a 62-year-old male.  He was admitted secondary to failure of his 

pain pump.  Xarelto was held prior to surgery and patient was placed on a 

heparin bridge.  Heparin was held for surgery today.  Replacement of the pain 

pump was performed today.  Patient will resume Xarelto this night.  Pain is 

well controlled.  No signs of bleeding.  No complaints from the patient.  

Patient wishes to discharge to home.  Patient will be discharged home today 

with follow-up with outpatient pain management.  Medically cleared and stable 

for discharge home this evening.


Pt Condition on Discharge:  Stable


Discharge Disposition:  Discharge Home


Discharge Time:  <= 30 minutes


Discharge Instructions


DIET: Follow Instructions for:  As Tolerated, No Restrictions


Activities you can perform:  Regular-No Restrictions


Follow up Referrals:  


Appointment for Follow Up





Continued Medications:  


Amiodarone (Amiodarone) 200 Mg Tab


400 MG PO BID for afib, #10 TAB 0 Refills





Amiodarone (Amiodarone) 200 Mg Tab


200 MG PO DAILY for afib, #30 TAB 6 Refills





Apixaban (Eliquis) 5 Mg Tab


5 MG PO BID for Blood Clot Prevention, #60 TAB 0 Refills





Lisinopril-Hctz (Lisinopril-Hctz) 20-12.5 Mg Tab


1 TAB PO DAILY for Blood Pressure Management, #30 TAB 0 Refills





Metformin (Metformin) 500 Mg Tab


500 MG PO BIDPC for Blood Sugar Management, #60 TAB 0 Refills


With meals


Metoprolol Tartrate (Metoprolol Tartrate) 100 Mg Tab


100 MG PO BID, #60 TAB 0 Refills





Potassium Chloride ER (Potassium Chloride ER) 10 Meq Cap


10 MEQ PO DAILY for Electrolyte Replacement, #30 CAP 0 Refills





Venlafaxine ER 24 HR (Venlafaxine ER 24 HR) 150 Mg Tab


150 MG PO DAILY, #30 TAB 0 Refills

















Man Orantes MD Mar 29, 2018 18:15

## 2018-03-29 NOTE — MP
cc:

DICK Sheppard MD

****

 

 

DATE OF OPERATION:

03/29/2018

 

YOB: 1955

 

PROCEDURE PERFORMED:

Replacement of [employer] SynchroMed pump.

 

PREPROCEDURE DIAGNOSIS:

End of battery life with implanted pump.

 

POSTPROCEDURE DIAGNOSES:

End of battery life with implanted pump.

 

DESCRIPTION OF PROCEDURE:

The patient was given IV antibiotics in the holding area.  The 

surgical site was marked.  The consent forms were signed, the patient 

was taken to the operating room, given general endotracheal anesthesia

and placed in the prone position.  All pressure points were checked 

and padded.  The skin over his existing implanted pump in the left 

buttocks area was scrubbed with alcohol, then with ChloraPrep and then

draped with sterile drapes.  The skin over the existing pump was 

infiltrated with 0.25% Marcaine containing epinephrine. An incision 

was made down to the existing pump.  Sharp and blunt dissection were 

used to free it from the underlying tissue.  It was anchored with four

2-0 Ethibond sutures which were cut.  The pump was removed intact.

 

Then, a needle was placed through the fill port and 15 mL of the 

existing contents were removed.  A new [employer]tron[employer] SynchroMed pump was 

placed on the side table and the 15 mL from the old pump was injected 

into the new pump.  Then, the new pump purged for 19 minutes until 

there were droplets coming out of the nipple of the pump.  At this 

point, the new pump was connected to the intrathecal catheter.  At no 

time was cerebrospinal fluid aspirated or extracted from the 

intrathecal catheter.  Then, the redundant coil was coiled behind the 

pump.  The new pump was placed in the subcutaneous pocket and anchored

to the underlying fascia using two 2-0 Ethibond sutures.  The incision

was irrigated with Betadine.  Closure took place with 3-0 Monocryl in 

the subcuticular tissue and 3-0 nylon on the skin.  The incision was 

covered with sterile adhesive dressings, and the patient was taken to 

the recovery room with stable vital signs.

 

 

__________________________________

DICK Sheppard MD

 

 

[facility]

D: 03/29/2018, 02:40 PM

T: 03/29/2018, 03:47 PM

Visit #: [id number]

Job #: [id number]